# Patient Record
Sex: MALE | Race: WHITE
[De-identification: names, ages, dates, MRNs, and addresses within clinical notes are randomized per-mention and may not be internally consistent; named-entity substitution may affect disease eponyms.]

---

## 2020-10-10 ENCOUNTER — HOSPITAL ENCOUNTER (EMERGENCY)
Dept: HOSPITAL 72 - EMR | Age: 55
Discharge: HOME | End: 2020-10-10
Payer: MEDICARE

## 2020-10-10 VITALS — DIASTOLIC BLOOD PRESSURE: 100 MMHG | SYSTOLIC BLOOD PRESSURE: 148 MMHG

## 2020-10-10 VITALS — SYSTOLIC BLOOD PRESSURE: 155 MMHG | DIASTOLIC BLOOD PRESSURE: 89 MMHG

## 2020-10-10 VITALS — BODY MASS INDEX: 26.52 KG/M2 | HEIGHT: 68 IN | WEIGHT: 175 LBS

## 2020-10-10 DIAGNOSIS — B20: ICD-10-CM

## 2020-10-10 DIAGNOSIS — Z90.79: ICD-10-CM

## 2020-10-10 DIAGNOSIS — I10: ICD-10-CM

## 2020-10-10 DIAGNOSIS — R10.2: Primary | ICD-10-CM

## 2020-10-10 PROCEDURE — 96372 THER/PROPH/DIAG INJ SC/IM: CPT

## 2020-10-10 PROCEDURE — 99283 EMERGENCY DEPT VISIT LOW MDM: CPT

## 2020-10-10 NOTE — EMERGENCY ROOM REPORT
History of Present Illness


General


Chief Complaint:  Lower Back Pain or Injury


Source:  Patient





Present Illness


HPI


Disclaimer: Please note that this report is being documented using DRAGON 

technology. This can lead to erroneous entry secondary to incorrect 

interpretation by the dictating instrument.





HPI: Is a 54-year-old male with a history of HIV undetectable viral load on 

retrovirals, prior lymphoma, orchiectomy and hernia repair presents for 

evaluation of worsening left-sided groin pain.  Patient states after his hernia 

repair, orchiectomy and surgery for lymphoma and lymph node dissection in the 

left groin he has had chronic pain for the past several years.  Worsening over 

the past few years following with pain management at [facility].  On 

the 20th of this month he is scheduled for inferior hypogastric plexus block at 

[facility].  Typically uses Tylenol Motrin however the pain is becoming unman

ageable on these medications.  He was seen at [facility] given a brief course 

of narcotic pain medications which he states helped.  He is requesting a short 

refill until he can get his nerve block procedure.  Denies any new injury.  

Denies dysuria, urinary retention and recently seen urology for uroscopy which 

was reportedly unremarkable.  Denies fever, chills, nausea, vomiting.  No other 

abdominal pain reported.  No other symptoms reported today.





PMH: Lymphoma, HIV, hernia, orchiectomy


 


PSH: Orchiectomy, left inguinal hernia repair


 


Allergies: Reviewed


 


Social Hx: Reviewed


Allergies:  


Coded Allergies:  


     No Known Allergies (Unverified , 10/10/20)





COVID-19 Screening


Contact w/high risk pt:  No


Experienced COVID-19 symptoms?:  No


COVID-19 Testing performed PTA:  No





Nursing Documentation-PMH


Hx Hypertension:  Yes





Review of Systems


All Other Systems:  negative except mentioned in HPI





Physical Exam





Vital Signs








  Date Time  Temp Pulse Resp B/P (MAP) Pulse Ox O2 Delivery O2 Flow Rate FiO2


 


10/10/20 19:18 98.8 95 16 148/102 (117) 98 Room Air  





 





General: Awake and alert, no acute distress


HEENT: NC/AT. EOMI. 


Resp: Normal work of breathing


Abdomen: Soft, nontender, nondistended.  


: Single testicle in anatomic position.  Nontender no overlying edema or 

erythema.  There is tenderness to palpation in the groin region on the left side

without palpable mass or deformity.  Pain is localized over prior surgical scar.

 No overlying signs of infection.


Skin: Intact.  Surgery scar in left groin clean dry and intact.


MSK: Normal tone and bulk. Moving all extremities.  No obvious deformity.


Neuro: Awake and alert.  Mentating appropriately





Medical Decision Making


Diagnostic Impression:  


   Primary Impression:  


   Groin pain


ER Course


54-year-old male presents for evaluation of chronic left groin pain requesting 

prescription medications.  According to patient this is chronic.  No new 

symptoms are present.  He is otherwise well-appearing I do not believe he 

requires emergent labs or imaging in the emergency department today.  Review of 

these cures report does show a single prescription for narcotics consistent with

the patient's history.  Patient appears to reasonable I will prescribe a short 

course of pain medication until he can be seen at his pain management doctor for

his nerve block procedure.  Instructed him that he should he develop any new 

symptoms that are out of line with his typical pain he should return to 

emergency department for reevaluation.  He agrees to this treatment plan.





Last Vital Signs








  Date Time  Temp Pulse Resp B/P (MAP) Pulse Ox O2 Delivery O2 Flow Rate FiO2


 


10/10/20 19:55 98.0 74 18 155/89 100 Room Air  








Disposition:  HOME, SELF-CARE


Condition:  Stable


Scripts


Hydrocodone Bit/Acetaminophen 7.5-325* (NORCO 7.5-325*) 1 Each Tablet


1 TAB ORAL Q6H PRN for For Pain, #12 TAB 0 Refills


   Prov: Ron Antonio MD         10/10/20


Referrals:  


[facility]











[facility]





Additional Instructions:  


Follow-up with your pain management doctor for planned procedure.  Return with 

new or worsening symptoms.











Ron Antonio MD              Oct 10, 2020 20:12

## 2020-10-10 NOTE — NUR
ED Nurse Note: pt walked into ED from home using cane c/o left groin pain 7/10 
radiating to left leg, pt reports history of testicular CA and inguinal hernia 
and takes norco for pain but recently ran out of medication and now pain is 
uncontrolled.

## 2020-10-10 NOTE — NUR
ER DISCHARGE NOTE:

Patient is cleared to be discharged per ERMD. Pt reported pain relief currently 
3/10. Encouraged pt to stay 15 minutes after morphine administration to monitor 
for s/e but pt refused. Pt is aox4, on room air, with stable vital signs. pt 
was given discharge paperwork and paper prescriptions. Pt was able to verbalize 
understanding, pt id band removed. pt is able to ambulate with steady gait. pt 
took all belongings.